# Patient Record
Sex: MALE | Race: OTHER | HISPANIC OR LATINO | URBAN - METROPOLITAN AREA
[De-identification: names, ages, dates, MRNs, and addresses within clinical notes are randomized per-mention and may not be internally consistent; named-entity substitution may affect disease eponyms.]

---

## 2024-04-05 ENCOUNTER — EMERGENCY (EMERGENCY)
Facility: HOSPITAL | Age: 19
LOS: 1 days | Discharge: HOME | End: 2024-04-05
Attending: STUDENT IN AN ORGANIZED HEALTH CARE EDUCATION/TRAINING PROGRAM | Admitting: STUDENT IN AN ORGANIZED HEALTH CARE EDUCATION/TRAINING PROGRAM
Payer: COMMERCIAL

## 2024-04-05 VITALS
RESPIRATION RATE: 20 BRPM | HEART RATE: 114 BPM | DIASTOLIC BLOOD PRESSURE: 59 MMHG | TEMPERATURE: 101 F | OXYGEN SATURATION: 97 % | SYSTOLIC BLOOD PRESSURE: 101 MMHG

## 2024-04-05 PROCEDURE — 93010 ELECTROCARDIOGRAM REPORT: CPT

## 2024-04-05 PROCEDURE — 99284 EMERGENCY DEPT VISIT MOD MDM: CPT

## 2024-04-05 NOTE — ED ADULT TRIAGE NOTE - CHIEF COMPLAINT QUOTE
Pt with no past medical history comes in c/o fever, sore throat, cough, headaches and generalized weakness since 4 days ago. Pt was seen at urgent care on Tuesday and had negative covid and flu tests . Had been taking Tylenol at home. Tonight he passed out in the bathroom and hit his head. Pt is febrile and tachycardic in triage.

## 2024-04-06 VITALS
DIASTOLIC BLOOD PRESSURE: 55 MMHG | OXYGEN SATURATION: 100 % | SYSTOLIC BLOOD PRESSURE: 109 MMHG | HEART RATE: 65 BPM | TEMPERATURE: 98 F | RESPIRATION RATE: 15 BRPM

## 2024-04-06 LAB
ALBUMIN SERPL ELPH-MCNC: 4.2 G/DL — SIGNIFICANT CHANGE UP (ref 3.3–5)
ALP SERPL-CCNC: 89 U/L — SIGNIFICANT CHANGE UP (ref 60–270)
ALT FLD-CCNC: 11 U/L — SIGNIFICANT CHANGE UP (ref 4–41)
ANION GAP SERPL CALC-SCNC: 14 MMOL/L — SIGNIFICANT CHANGE UP (ref 7–14)
APPEARANCE UR: CLEAR — SIGNIFICANT CHANGE UP
APTT BLD: 31.7 SEC — SIGNIFICANT CHANGE UP (ref 24.5–35.6)
AST SERPL-CCNC: 20 U/L — SIGNIFICANT CHANGE UP (ref 4–40)
BACTERIA # UR AUTO: NEGATIVE /HPF — SIGNIFICANT CHANGE UP
BASE EXCESS BLDV CALC-SCNC: 2 MMOL/L — SIGNIFICANT CHANGE UP (ref -2–3)
BASOPHILS # BLD AUTO: 0.01 K/UL — SIGNIFICANT CHANGE UP (ref 0–0.2)
BASOPHILS NFR BLD AUTO: 0.1 % — SIGNIFICANT CHANGE UP (ref 0–2)
BILIRUB SERPL-MCNC: 0.4 MG/DL — SIGNIFICANT CHANGE UP (ref 0.2–1.2)
BILIRUB UR-MCNC: NEGATIVE — SIGNIFICANT CHANGE UP
BLOOD GAS VENOUS COMPREHENSIVE RESULT: SIGNIFICANT CHANGE UP
BUN SERPL-MCNC: 11 MG/DL — SIGNIFICANT CHANGE UP (ref 7–23)
CALCIUM SERPL-MCNC: 9 MG/DL — SIGNIFICANT CHANGE UP (ref 8.4–10.5)
CAST: 2 /LPF — SIGNIFICANT CHANGE UP (ref 0–4)
CHLORIDE BLDV-SCNC: 97 MMOL/L — SIGNIFICANT CHANGE UP (ref 96–108)
CHLORIDE SERPL-SCNC: 97 MMOL/L — LOW (ref 98–107)
CO2 BLDV-SCNC: 28.7 MMOL/L — HIGH (ref 22–26)
CO2 SERPL-SCNC: 23 MMOL/L — SIGNIFICANT CHANGE UP (ref 22–31)
COLOR SPEC: YELLOW — SIGNIFICANT CHANGE UP
CREAT SERPL-MCNC: 0.87 MG/DL — SIGNIFICANT CHANGE UP (ref 0.5–1.3)
DIFF PNL FLD: NEGATIVE — SIGNIFICANT CHANGE UP
EGFR: 127 ML/MIN/1.73M2 — SIGNIFICANT CHANGE UP
EOSINOPHIL # BLD AUTO: 0 K/UL — SIGNIFICANT CHANGE UP (ref 0–0.5)
EOSINOPHIL NFR BLD AUTO: 0 % — SIGNIFICANT CHANGE UP (ref 0–6)
FLUAV AG NPH QL: SIGNIFICANT CHANGE UP
FLUBV AG NPH QL: DETECTED
GAS PNL BLDV: 133 MMOL/L — LOW (ref 136–145)
GLUCOSE BLDV-MCNC: 99 MG/DL — SIGNIFICANT CHANGE UP (ref 70–99)
GLUCOSE SERPL-MCNC: 100 MG/DL — HIGH (ref 70–99)
GLUCOSE UR QL: NEGATIVE MG/DL — SIGNIFICANT CHANGE UP
HCO3 BLDV-SCNC: 27 MMOL/L — SIGNIFICANT CHANGE UP (ref 22–29)
HCT VFR BLD CALC: 41.7 % — SIGNIFICANT CHANGE UP (ref 39–50)
HCT VFR BLDA CALC: 47 % — SIGNIFICANT CHANGE UP (ref 39–51)
HGB BLD CALC-MCNC: 15.6 G/DL — SIGNIFICANT CHANGE UP (ref 12.6–17.4)
HGB BLD-MCNC: 14.9 G/DL — SIGNIFICANT CHANGE UP (ref 13–17)
IANC: 8.34 K/UL — HIGH (ref 1.8–7.4)
IMM GRANULOCYTES NFR BLD AUTO: 0.4 % — SIGNIFICANT CHANGE UP (ref 0–0.9)
INR BLD: 1.27 RATIO — HIGH (ref 0.85–1.18)
KETONES UR-MCNC: 15 MG/DL
LACTATE BLDV-MCNC: 1.4 MMOL/L — SIGNIFICANT CHANGE UP (ref 0.5–2)
LEUKOCYTE ESTERASE UR-ACNC: NEGATIVE — SIGNIFICANT CHANGE UP
LYMPHOCYTES # BLD AUTO: 0.77 K/UL — LOW (ref 1–3.3)
LYMPHOCYTES # BLD AUTO: 7.6 % — LOW (ref 13–44)
MCHC RBC-ENTMCNC: 30.4 PG — SIGNIFICANT CHANGE UP (ref 27–34)
MCHC RBC-ENTMCNC: 35.7 GM/DL — SIGNIFICANT CHANGE UP (ref 32–36)
MCV RBC AUTO: 85.1 FL — SIGNIFICANT CHANGE UP (ref 80–100)
MONOCYTES # BLD AUTO: 0.93 K/UL — HIGH (ref 0–0.9)
MONOCYTES NFR BLD AUTO: 9.2 % — SIGNIFICANT CHANGE UP (ref 2–14)
NEUTROPHILS # BLD AUTO: 8.34 K/UL — HIGH (ref 1.8–7.4)
NEUTROPHILS NFR BLD AUTO: 82.7 % — HIGH (ref 43–77)
NITRITE UR-MCNC: NEGATIVE — SIGNIFICANT CHANGE UP
NRBC # BLD: 0 /100 WBCS — SIGNIFICANT CHANGE UP (ref 0–0)
NRBC # FLD: 0 K/UL — SIGNIFICANT CHANGE UP (ref 0–0)
PCO2 BLDV: 44 MMHG — SIGNIFICANT CHANGE UP (ref 42–55)
PH BLDV: 7.4 — SIGNIFICANT CHANGE UP (ref 7.32–7.43)
PH UR: 6.5 — SIGNIFICANT CHANGE UP (ref 5–8)
PLATELET # BLD AUTO: 191 K/UL — SIGNIFICANT CHANGE UP (ref 150–400)
PO2 BLDV: 34 MMHG — SIGNIFICANT CHANGE UP (ref 25–45)
POTASSIUM BLDV-SCNC: 3.9 MMOL/L — SIGNIFICANT CHANGE UP (ref 3.5–5.1)
POTASSIUM SERPL-MCNC: 4.1 MMOL/L — SIGNIFICANT CHANGE UP (ref 3.5–5.3)
POTASSIUM SERPL-SCNC: 4.1 MMOL/L — SIGNIFICANT CHANGE UP (ref 3.5–5.3)
PROT SERPL-MCNC: 8.1 G/DL — SIGNIFICANT CHANGE UP (ref 6–8.3)
PROT UR-MCNC: 30 MG/DL
PROTHROM AB SERPL-ACNC: 14.1 SEC — HIGH (ref 9.5–13)
RBC # BLD: 4.9 M/UL — SIGNIFICANT CHANGE UP (ref 4.2–5.8)
RBC # FLD: 11.9 % — SIGNIFICANT CHANGE UP (ref 10.3–14.5)
RBC CASTS # UR COMP ASSIST: 5 /HPF — HIGH (ref 0–4)
RSV RNA NPH QL NAA+NON-PROBE: SIGNIFICANT CHANGE UP
SAO2 % BLDV: 59.6 % — LOW (ref 67–88)
SARS-COV-2 RNA SPEC QL NAA+PROBE: SIGNIFICANT CHANGE UP
SODIUM SERPL-SCNC: 134 MMOL/L — LOW (ref 135–145)
SP GR SPEC: 1.03 — SIGNIFICANT CHANGE UP (ref 1–1.03)
SQUAMOUS # UR AUTO: 0 /HPF — SIGNIFICANT CHANGE UP (ref 0–5)
UROBILINOGEN FLD QL: 1 MG/DL — SIGNIFICANT CHANGE UP (ref 0.2–1)
WBC # BLD: 10.09 K/UL — SIGNIFICANT CHANGE UP (ref 3.8–10.5)
WBC # FLD AUTO: 10.09 K/UL — SIGNIFICANT CHANGE UP (ref 3.8–10.5)
WBC UR QL: 0 /HPF — SIGNIFICANT CHANGE UP (ref 0–5)

## 2024-04-06 PROCEDURE — 71046 X-RAY EXAM CHEST 2 VIEWS: CPT | Mod: 26

## 2024-04-06 RX ORDER — ACETAMINOPHEN 500 MG
1000 TABLET ORAL ONCE
Refills: 0 | Status: COMPLETED | OUTPATIENT
Start: 2024-04-06 | End: 2024-04-06

## 2024-04-06 RX ORDER — SODIUM CHLORIDE 9 MG/ML
2000 INJECTION INTRAMUSCULAR; INTRAVENOUS; SUBCUTANEOUS ONCE
Refills: 0 | Status: COMPLETED | OUTPATIENT
Start: 2024-04-06 | End: 2024-04-06

## 2024-04-06 RX ADMIN — Medication 400 MILLIGRAM(S): at 00:41

## 2024-04-06 RX ADMIN — SODIUM CHLORIDE 2000 MILLILITER(S): 9 INJECTION INTRAMUSCULAR; INTRAVENOUS; SUBCUTANEOUS at 00:42

## 2024-04-06 NOTE — ED ADULT NURSE NOTE - OBJECTIVE STATEMENT
A&Ox4. Denies past medical history. Comes in c/o fever, sore throat, cough, headaches and generalized weakness since 4 days ago. Pt was seen at urgent care on Tuesday and had negative covid and flu tests . Had been taking Tylenol, Mucinex and Nyquil at home. Tonight he passed out in the bathroom and hit his head. States fall was unwitnessed and not sure how long he was out for, but his brothers found him. No visible lacerations or bruising to the head. Pt is febrile and tachycardic in triage. Denies CP, SOB or N/V/D. Respirations even and unlabored. 20 gauge IV placed in right AC. Labs obtained. Medications given as per current care plan. CXR complete. Safety precautions in place.

## 2024-04-06 NOTE — ED ADULT NURSE NOTE - NSFALLRISKINTERV_ED_ALL_ED

## 2024-04-06 NOTE — ED PROVIDER NOTE - PATIENT PORTAL LINK FT
You can access the FollowMyHealth Patient Portal offered by [facility] by registering at the following website: [email or web address]. By joining [facility]’s FollowMyHealth portal, you will also be able to view your health information using other applications (apps) compatible with our system.

## 2024-04-06 NOTE — ED PROVIDER NOTE - CLINICAL SUMMARY MEDICAL DECISION MAKING FREE TEXT BOX
18-year-old male, denies past medical history, presents to ED complaining of fever, sore throat, cough, headache, and generalized weakness x 4 days.  Patient notes immediately prior to this was sick with a "stomach bug".  Patient was seen in urgent care 3 days ago and had negative COVID/flu test.  Patient has been taking Tylenol at home with temporary relief of fever.  Patient notes that he has had poor p.o. intake last few days.  Tonight he was having a bowel movement in the bathroom, states that he stood up and then immediately passed out.  Patient believes that he may have hit his head.  Patient was unconscious for approximately 1 minute, was helped up by his family.  Patient has been ambulatory since syncope.  Patient has never passed out before.  Patient denies any vision changes, chest pain, shortness of breath, abdominal pain, nausea/vomiting/diarrhea, urinary symptoms, weakness/numbness, lightheadedness/dizziness, rashes or any other symptoms at this time.  Patient also denies recent travel/sick contacts.    Patient is febrile and tachycardic, but is normotensive and satting well on room air.  Physical exam significant for ill-appearing male, in no acute distress.  Head is normocephalic is atraumatic.  Extraocular movements intact.  Pupils equal round reactive to light.  No conjunctival pallor.  Cranial nerves II to XII intact.  Patient with bilateral cervical lymphadenopathy.  Posterior oropharynx is slightly erythematous.  No tonsillar edema/exudates.  Uvula is midline.  No pooling secretions.  Heart is tachycardic, without murmur.  Lungs clear to auscultation bilaterally.  No stridor.  Bilateral TMs are clear.  Abdomen is soft and nontender/nondistended.  No CVA tenderness.  No lower extremity edema.  Skin is warm and well-perfused without rashes.  Normal strength/sensation in all extremities.  Pulses are 2+ throughout.    Differential diagnosis includes but not limited to viral syndrome versus pneumonia.  Patient presentation/vitals  SIRS criteria.  Will perform infectious workup.  Plan to check basic labs/electrolytes, VBG, blood cultures, flu/COVID swab, UA, and chest x-ray.  Will provide IV fluid and Tylenol for symptom relief.  Given low suspicion for bacterial etiology, will hold off on empiric antibiotics for the time being.  Dispo pending results and reassessment.

## 2024-04-06 NOTE — ED PROVIDER NOTE - PROGRESS NOTE DETAILS
Mary MIX: Received sign out from my colleague Dr. Castro pt presents w c/f viral syndrome found to be flu positive, studies otherwise non actionable, Strict return precautions were discussed. Pt expressed understanding.

## 2024-04-07 LAB
CULTURE RESULTS: SIGNIFICANT CHANGE UP
SPECIMEN SOURCE: SIGNIFICANT CHANGE UP

## 2024-04-11 LAB
CULTURE RESULTS: SIGNIFICANT CHANGE UP
CULTURE RESULTS: SIGNIFICANT CHANGE UP
SPECIMEN SOURCE: SIGNIFICANT CHANGE UP
SPECIMEN SOURCE: SIGNIFICANT CHANGE UP

## 2024-08-28 ENCOUNTER — INPATIENT (INPATIENT)
Facility: HOSPITAL | Age: 19
LOS: 0 days | Discharge: HOME | End: 2024-08-29
Attending: SURGERY | Admitting: SURGERY
Payer: COMMERCIAL

## 2024-08-28 VITALS
RESPIRATION RATE: 16 BRPM | WEIGHT: 130.07 LBS | OXYGEN SATURATION: 99 % | DIASTOLIC BLOOD PRESSURE: 64 MMHG | SYSTOLIC BLOOD PRESSURE: 100 MMHG | TEMPERATURE: 98 F | HEART RATE: 65 BPM

## 2024-08-28 DIAGNOSIS — K35.80 UNSPECIFIED ACUTE APPENDICITIS: ICD-10-CM

## 2024-08-28 LAB
ALBUMIN SERPL ELPH-MCNC: 4.4 G/DL — SIGNIFICANT CHANGE UP (ref 3.3–5)
ALP SERPL-CCNC: 88 U/L — SIGNIFICANT CHANGE UP (ref 60–270)
ALT FLD-CCNC: 11 U/L — SIGNIFICANT CHANGE UP (ref 4–41)
ANION GAP SERPL CALC-SCNC: 12 MMOL/L — SIGNIFICANT CHANGE UP (ref 7–14)
APPEARANCE UR: CLEAR — SIGNIFICANT CHANGE UP
APTT BLD: 28.9 SEC — SIGNIFICANT CHANGE UP (ref 24.5–35.6)
AST SERPL-CCNC: 16 U/L — SIGNIFICANT CHANGE UP (ref 4–40)
BACTERIA # UR AUTO: NEGATIVE /HPF — SIGNIFICANT CHANGE UP
BASOPHILS # BLD AUTO: 0.04 K/UL — SIGNIFICANT CHANGE UP (ref 0–0.2)
BASOPHILS NFR BLD AUTO: 0.2 % — SIGNIFICANT CHANGE UP (ref 0–2)
BILIRUB SERPL-MCNC: 0.9 MG/DL — SIGNIFICANT CHANGE UP (ref 0.2–1.2)
BILIRUB UR-MCNC: NEGATIVE — SIGNIFICANT CHANGE UP
BLD GP AB SCN SERPL QL: NEGATIVE — SIGNIFICANT CHANGE UP
BLOOD GAS VENOUS COMPREHENSIVE RESULT: SIGNIFICANT CHANGE UP
BUN SERPL-MCNC: 11 MG/DL — SIGNIFICANT CHANGE UP (ref 7–23)
CALCIUM SERPL-MCNC: 9.6 MG/DL — SIGNIFICANT CHANGE UP (ref 8.4–10.5)
CAST: 1 /LPF — SIGNIFICANT CHANGE UP (ref 0–4)
CHLORIDE SERPL-SCNC: 101 MMOL/L — SIGNIFICANT CHANGE UP (ref 98–107)
CO2 SERPL-SCNC: 23 MMOL/L — SIGNIFICANT CHANGE UP (ref 22–31)
COLOR SPEC: YELLOW — SIGNIFICANT CHANGE UP
CREAT SERPL-MCNC: 0.71 MG/DL — SIGNIFICANT CHANGE UP (ref 0.5–1.3)
DIFF PNL FLD: NEGATIVE — SIGNIFICANT CHANGE UP
EGFR: 136 ML/MIN/1.73M2 — SIGNIFICANT CHANGE UP
EOSINOPHIL # BLD AUTO: 0 K/UL — SIGNIFICANT CHANGE UP (ref 0–0.5)
EOSINOPHIL NFR BLD AUTO: 0 % — SIGNIFICANT CHANGE UP (ref 0–6)
GLUCOSE SERPL-MCNC: 115 MG/DL — HIGH (ref 70–99)
GLUCOSE UR QL: NEGATIVE MG/DL — SIGNIFICANT CHANGE UP
HCT VFR BLD CALC: 41.7 % — SIGNIFICANT CHANGE UP (ref 39–50)
HGB BLD-MCNC: 14.8 G/DL — SIGNIFICANT CHANGE UP (ref 13–17)
IANC: 21.25 K/UL — HIGH (ref 1.8–7.4)
IMM GRANULOCYTES NFR BLD AUTO: 0.7 % — SIGNIFICANT CHANGE UP (ref 0–0.9)
INR BLD: 1.18 RATIO — SIGNIFICANT CHANGE UP (ref 0.85–1.18)
KETONES UR-MCNC: 40 MG/DL
LEUKOCYTE ESTERASE UR-ACNC: NEGATIVE — SIGNIFICANT CHANGE UP
LIDOCAIN IGE QN: 23 U/L — SIGNIFICANT CHANGE UP (ref 7–60)
LYMPHOCYTES # BLD AUTO: 0.47 K/UL — LOW (ref 1–3.3)
LYMPHOCYTES # BLD AUTO: 2 % — LOW (ref 13–44)
MCHC RBC-ENTMCNC: 31.1 PG — SIGNIFICANT CHANGE UP (ref 27–34)
MCHC RBC-ENTMCNC: 35.5 GM/DL — SIGNIFICANT CHANGE UP (ref 32–36)
MCV RBC AUTO: 87.6 FL — SIGNIFICANT CHANGE UP (ref 80–100)
MONOCYTES # BLD AUTO: 1.13 K/UL — HIGH (ref 0–0.9)
MONOCYTES NFR BLD AUTO: 4.9 % — SIGNIFICANT CHANGE UP (ref 2–14)
NEUTROPHILS # BLD AUTO: 21.25 K/UL — HIGH (ref 1.8–7.4)
NEUTROPHILS NFR BLD AUTO: 92.2 % — HIGH (ref 43–77)
NITRITE UR-MCNC: NEGATIVE — SIGNIFICANT CHANGE UP
NRBC # BLD: 0 /100 WBCS — SIGNIFICANT CHANGE UP (ref 0–0)
NRBC # FLD: 0 K/UL — SIGNIFICANT CHANGE UP (ref 0–0)
PH UR: 8 — SIGNIFICANT CHANGE UP (ref 5–8)
PLATELET # BLD AUTO: 263 K/UL — SIGNIFICANT CHANGE UP (ref 150–400)
POTASSIUM SERPL-MCNC: 3.7 MMOL/L — SIGNIFICANT CHANGE UP (ref 3.5–5.3)
POTASSIUM SERPL-SCNC: 3.7 MMOL/L — SIGNIFICANT CHANGE UP (ref 3.5–5.3)
PROT SERPL-MCNC: 7.5 G/DL — SIGNIFICANT CHANGE UP (ref 6–8.3)
PROT UR-MCNC: 30 MG/DL
PROTHROM AB SERPL-ACNC: 13.2 SEC — HIGH (ref 9.5–13)
RBC # BLD: 4.76 M/UL — SIGNIFICANT CHANGE UP (ref 4.2–5.8)
RBC # FLD: 11.9 % — SIGNIFICANT CHANGE UP (ref 10.3–14.5)
RBC CASTS # UR COMP ASSIST: 3 /HPF — SIGNIFICANT CHANGE UP (ref 0–4)
RH IG SCN BLD-IMP: POSITIVE — SIGNIFICANT CHANGE UP
SODIUM SERPL-SCNC: 136 MMOL/L — SIGNIFICANT CHANGE UP (ref 135–145)
SP GR SPEC: 1.05 — HIGH (ref 1–1.03)
SQUAMOUS # UR AUTO: 0 /HPF — SIGNIFICANT CHANGE UP (ref 0–5)
UROBILINOGEN FLD QL: 1 MG/DL — SIGNIFICANT CHANGE UP (ref 0.2–1)
WBC # BLD: 23.05 K/UL — HIGH (ref 3.8–10.5)
WBC # FLD AUTO: 23.05 K/UL — HIGH (ref 3.8–10.5)
WBC UR QL: 1 /HPF — SIGNIFICANT CHANGE UP (ref 0–5)

## 2024-08-28 PROCEDURE — 88304 TISSUE EXAM BY PATHOLOGIST: CPT | Mod: 26

## 2024-08-28 PROCEDURE — 99285 EMERGENCY DEPT VISIT HI MDM: CPT

## 2024-08-28 PROCEDURE — 74177 CT ABD & PELVIS W/CONTRAST: CPT | Mod: 26,MC

## 2024-08-28 DEVICE — STAPLER COVIDIEN TRI-STAPLE 45MM TAN RELOAD: Type: IMPLANTABLE DEVICE | Status: FUNCTIONAL

## 2024-08-28 RX ORDER — SODIUM CHLORIDE 9 MG/ML
1000 INJECTION INTRAMUSCULAR; INTRAVENOUS; SUBCUTANEOUS ONCE
Refills: 0 | Status: COMPLETED | OUTPATIENT
Start: 2024-08-28 | End: 2024-08-28

## 2024-08-28 RX ORDER — PIPERACILLIN SODIUM AND TAZOBACTAM SODIUM 3; .375 G/15ML; G/15ML
3.38 INJECTION, POWDER, FOR SOLUTION INTRAVENOUS ONCE
Refills: 0 | Status: COMPLETED | OUTPATIENT
Start: 2024-08-28 | End: 2024-08-28

## 2024-08-28 RX ORDER — FAMOTIDINE 10 MG/ML
20 INJECTION INTRAVENOUS ONCE
Refills: 0 | Status: COMPLETED | OUTPATIENT
Start: 2024-08-28 | End: 2024-08-28

## 2024-08-28 RX ORDER — ENOXAPARIN SODIUM 100 MG/ML
40 INJECTION SUBCUTANEOUS EVERY 24 HOURS
Refills: 0 | Status: DISCONTINUED | OUTPATIENT
Start: 2024-08-29 | End: 2024-08-29

## 2024-08-28 RX ORDER — KETOROLAC TROMETHAMINE 30 MG/ML
15 INJECTION, SOLUTION INTRAMUSCULAR ONCE
Refills: 0 | Status: DISCONTINUED | OUTPATIENT
Start: 2024-08-28 | End: 2024-08-28

## 2024-08-28 RX ORDER — PIPERACILLIN SODIUM AND TAZOBACTAM SODIUM 3; .375 G/15ML; G/15ML
3.38 INJECTION, POWDER, FOR SOLUTION INTRAVENOUS EVERY 8 HOURS
Refills: 0 | Status: DISCONTINUED | OUTPATIENT
Start: 2024-08-29 | End: 2024-08-29

## 2024-08-28 RX ORDER — ONDANSETRON 2 MG/ML
4 INJECTION, SOLUTION INTRAMUSCULAR; INTRAVENOUS ONCE
Refills: 0 | Status: COMPLETED | OUTPATIENT
Start: 2024-08-28 | End: 2024-08-28

## 2024-08-28 RX ORDER — ACETAMINOPHEN 325 MG/1
1000 TABLET ORAL EVERY 6 HOURS
Refills: 0 | Status: DISCONTINUED | OUTPATIENT
Start: 2024-08-28 | End: 2024-08-29

## 2024-08-28 RX ORDER — MAGNESIUM, ALUMINUM HYDROXIDE 200-225/5
30 SUSPENSION, ORAL (FINAL DOSE FORM) ORAL ONCE
Refills: 0 | Status: COMPLETED | OUTPATIENT
Start: 2024-08-28 | End: 2024-08-28

## 2024-08-28 RX ADMIN — Medication 100 MILLILITER(S): at 22:17

## 2024-08-28 RX ADMIN — PIPERACILLIN SODIUM AND TAZOBACTAM SODIUM 200 GRAM(S): 3; .375 INJECTION, POWDER, FOR SOLUTION INTRAVENOUS at 20:57

## 2024-08-28 RX ADMIN — SODIUM CHLORIDE 1000 MILLILITER(S): 9 INJECTION INTRAMUSCULAR; INTRAVENOUS; SUBCUTANEOUS at 20:23

## 2024-08-28 RX ADMIN — ONDANSETRON 4 MILLIGRAM(S): 2 INJECTION, SOLUTION INTRAMUSCULAR; INTRAVENOUS at 19:13

## 2024-08-28 RX ADMIN — FAMOTIDINE 20 MILLIGRAM(S): 10 INJECTION INTRAVENOUS at 19:13

## 2024-08-28 RX ADMIN — Medication 30 MILLILITER(S): at 19:12

## 2024-08-28 RX ADMIN — SODIUM CHLORIDE 1000 MILLILITER(S): 9 INJECTION INTRAMUSCULAR; INTRAVENOUS; SUBCUTANEOUS at 19:11

## 2024-08-28 RX ADMIN — KETOROLAC TROMETHAMINE 15 MILLIGRAM(S): 30 INJECTION, SOLUTION INTRAMUSCULAR at 21:21

## 2024-08-28 NOTE — ED PROVIDER NOTE - OBJECTIVE STATEMENT
18-year-old male with no known past medical history presents to the ED complaining of 1 day of periumbilical abdominal pain.  Patient describes persistent periumbilical abdominal pain, multiple episodes of nonbloody nonbilious vomiting.  He denies diarrhea, constipation, bloody stool, fevers, chills, or any other associated symptoms.

## 2024-08-28 NOTE — H&P ADULT - NSHPPHYSICALEXAM_GEN_ALL_CORE
VITAL SIGNS:  ICU Vital Signs Last 24 Hrs  T(C): 36.8 (28 Aug 2024 21:39), Max: 37.2 (28 Aug 2024 17:58)  T(F): 98.3 (28 Aug 2024 21:39), Max: 98.9 (28 Aug 2024 17:58)  HR: 67 (28 Aug 2024 21:39) (65 - 67)  BP: 107/64 (28 Aug 2024 21:39) (100/64 - 117/74)  BP(mean): --  ABP: --  ABP(mean): --  RR: 16 (28 Aug 2024 21:39) (16 - 16)  SpO2: 95% (28 Aug 2024 21:39) (95% - 99%)    O2 Parameters below as of 28 Aug 2024 21:39  Patient On (Oxygen Delivery Method): room air            PHYSICAL EXAMINATION:  General - well-nourished, no acute distress  Neuro - awake, alert, oriented   Lungs - breathing comfortably on room air  Heart - pulse regular  Abdomen - soft, tender to palpation in RLQ, positive Mcburney, nondistended  Extremities - all four extremities are warm

## 2024-08-28 NOTE — ED ADULT NURSE NOTE - OBJECTIVE STATEMENT
Received pt to intake 16, A+Ox4, ambulatory. C/O abdominal pain, nausea and vomiting since 10 am this morning, pt endorsing fatigue. Respirations even and unlabored, normal work of breathing, no accessory muscle use, speaking in full clear uninterrupted sentences. ABD is soft, non tender, non distended. Pt denies any chest pain, SOB, headache, dizziness, diarrhea, fever, chills. 20G to RAC, Labs sent, Medicated as per Provider orders, plan of care ongoing.

## 2024-08-28 NOTE — ED ADULT TRIAGE NOTE - CHIEF COMPLAINT QUOTE
Pt presents for generalized weakness, abdominal pain, n/v starting today, denies diarrhea/constipation, no dysuria/hematuria, afebrile, no past medical history.

## 2024-08-28 NOTE — H&P ADULT - NSHPLABSRESULTS_GEN_ALL_CORE
14.8   23.05 )-----------( 263      ( 28 Aug 2024 19:10 )             41.7       08-28    136  |  101  |  11  ----------------------------<  115<H>  3.7   |  23  |  0.71    Ca    9.6      28 Aug 2024 19:10    TPro  7.5  /  Alb  4.4  /  TBili  0.9  /  DBili  x   /  AST  16  /  ALT  11  /  AlkPhos  88  08-28      PT/INR - ( 28 Aug 2024 19:10 )   PT: 13.2 sec;   INR: 1.18 ratio         PTT - ( 28 Aug 2024 19:10 )  PTT:28.9 sec        VBG - ( 28 Aug 2024 19:10 )  pH: 7.38  /  pCO2: 48    /  pO2: 36    / HCO3: 28    / Base Excess: 2.4   /  SaO2: 61.9   Lactate: 1.8       IMAGING STUDIES:  < from: CT Abdomen and Pelvis w/ IV Cont (08.28.24 @ 20:03) >      CONTRAST/COMPLICATIONS:  IV Contrast: Omnipaque 350  90 cc administered   10 cc discarded  Oral Contrast: NONE  Complications: None reported at time of study completion    PROCEDURE:  CT of the Abdomen and Pelvis was performed.  Sagittal and coronal reformats were performed.    FINDINGS:  LOWER CHEST: Within normal limits.    LIVER: Within normal limits.  BILE DUCTS: Normal caliber.  GALLBLADDER: Within normal limits.  SPLEEN: Within normal limits.  PANCREAS: Within normal limits.  ADRENALS: Within normal limits.  KIDNEYS/URETERS: Within normal limits.    BLADDER: Within normal limits.  REPRODUCTIVE ORGANS: Prostate within normal limits.    BOWEL: No bowel obstruction. Appendix is distended with wall thickening   and periappendicular fluid with mild inflammatory changes.  PERITONEUM/RETROPERITONEUM: Within normal limits.  VESSELS: Within normal limits.  LYMPH NODES: No lymphadenopathy.  ABDOMINAL WALL: Within normal limits.  BONES: Within normal limits.    < end of copied text >

## 2024-08-28 NOTE — ED ADULT NURSE REASSESSMENT NOTE - NS ED NURSE REASSESS COMMENT FT1
Pt resting in stretcher, awaiting transport to admission bed. Offers no complaints at this time. IVF infusing as per orders. safety maintained throughout

## 2024-08-28 NOTE — H&P ADULT - ASSESSMENT
Mr. Lombardi is a 18 year old man presenting with acute appendicitis.    Plan:  - admit to surgery, Dr. Palacio  - added on for OR tonight, patient consented  - NPO/IVF resuscitation  - prn pain meds  - VTE ppx with LVX    discussed with Dr. Palacio    B Team Surgery   [id number]

## 2024-08-28 NOTE — ED PROVIDER NOTE - CLINICAL SUMMARY MEDICAL DECISION MAKING FREE TEXT BOX
18-year-old male with no known past medical history presents to the ED complaining of 1 day of periumbilical abdominal pain. HD stable, Imp: Abdominal pain, concern for acute appendicitis vs renal colic vs UTI vs gastroenteritis. Plan for labs, CT A/P, meds, reassess.

## 2024-08-28 NOTE — H&P ADULT - ATTENDING COMMENTS
Pt seen and examined.  Agree with resident eval and plan.  Imp: Acute appendicitis.  OR for lap appendectomy.  Consent obtained with risks and benefits discussed with patient.

## 2024-08-28 NOTE — ED PROVIDER NOTE - ATTENDING APP SHARED VISIT CONTRIBUTION OF CARE
Patient TBA for appendectomy after PTED with Anorexia N,V and PU pain as described as described mild WBCs 23!  CT= Acute uncomplicated appendicitis.   Surgery aware to eval patient and make reccs PTA   NPO antibiotics analgesics IVFs   will reassess

## 2024-08-29 VITALS
TEMPERATURE: 97 F | RESPIRATION RATE: 13 BRPM | DIASTOLIC BLOOD PRESSURE: 68 MMHG | HEART RATE: 58 BPM | OXYGEN SATURATION: 99 % | SYSTOLIC BLOOD PRESSURE: 104 MMHG

## 2024-08-29 PROBLEM — Z78.9 OTHER SPECIFIED HEALTH STATUS: Chronic | Status: ACTIVE | Noted: 2024-04-06

## 2024-08-29 PROCEDURE — 99222 1ST HOSP IP/OBS MODERATE 55: CPT | Mod: GC,57

## 2024-08-29 PROCEDURE — 44970 LAPAROSCOPY APPENDECTOMY: CPT | Mod: GC

## 2024-08-29 RX ORDER — FENTANYL CITRATE 50 UG/ML
50 INJECTION INTRAMUSCULAR; INTRAVENOUS
Refills: 0 | Status: DISCONTINUED | OUTPATIENT
Start: 2024-08-29 | End: 2024-08-29

## 2024-08-29 RX ORDER — ACETAMINOPHEN 325 MG/1
2 TABLET ORAL
Qty: 0 | Refills: 0 | DISCHARGE
Start: 2024-08-29

## 2024-08-29 RX ORDER — OXYCODONE HYDROCHLORIDE 5 MG/1
5 TABLET ORAL EVERY 6 HOURS
Refills: 0 | Status: DISCONTINUED | OUTPATIENT
Start: 2024-08-29 | End: 2024-08-29

## 2024-08-29 RX ORDER — ACETAMINOPHEN 325 MG/1
1000 TABLET ORAL EVERY 6 HOURS
Refills: 0 | Status: DISCONTINUED | OUTPATIENT
Start: 2024-08-29 | End: 2024-08-29

## 2024-08-29 RX ORDER — HYDROMORPHONE HYDROCHLORIDE 2 MG/1
0.25 TABLET ORAL
Refills: 0 | Status: DISCONTINUED | OUTPATIENT
Start: 2024-08-29 | End: 2024-08-29

## 2024-08-29 RX ORDER — ONDANSETRON 2 MG/ML
4 INJECTION, SOLUTION INTRAMUSCULAR; INTRAVENOUS ONCE
Refills: 0 | Status: DISCONTINUED | OUTPATIENT
Start: 2024-08-29 | End: 2024-08-29

## 2024-08-29 RX ORDER — OXYCODONE HYDROCHLORIDE 5 MG/1
1 TABLET ORAL
Qty: 5 | Refills: 0
Start: 2024-08-29

## 2024-08-29 RX ORDER — OXYCODONE HYDROCHLORIDE 5 MG/1
5 TABLET ORAL ONCE
Refills: 0 | Status: COMPLETED | OUTPATIENT
Start: 2024-08-29 | End: 2024-08-29

## 2024-08-29 RX ORDER — OXYCODONE HYDROCHLORIDE 5 MG/1
2.5 TABLET ORAL EVERY 6 HOURS
Refills: 0 | Status: DISCONTINUED | OUTPATIENT
Start: 2024-08-29 | End: 2024-08-29

## 2024-08-29 NOTE — ASU DISCHARGE PLAN (ADULT/PEDIATRIC) - MEDICATION INSTRUCTIONS
Take Tylenol 1g every 6 hours. Take Ibuprofen 600 mg every 6 hours. Alternate so that you are taking either Tylenol or Ibuprofen every three hours. Take narcotic pain medication for pain not relived by scheduled, over-the-counter medications.

## 2024-08-29 NOTE — ED ADULT NURSE REASSESSMENT NOTE - NS ED NURSE REASSESS COMMENT FT1
Pt to be taken to OR from emergency department at this time. report given to OR as well as inpatient RN on [address]. Pt belongings and valuables with pt mother.

## 2024-08-29 NOTE — BRIEF OPERATIVE NOTE - OPERATION/FINDINGS
Entered abdomen via supraumbilical Fredo. Additional ports placed under direct visualization. Base of appendix identified in RLQ. Window created between appendix and mesoappendix; appendix divided with EndoGIA stapler. Mesoappendix divided with Ligasure.

## 2024-08-29 NOTE — ASU DISCHARGE PLAN (ADULT/PEDIATRIC) - CARE PROVIDER_API CALL
Andrea Palacio  Surgery  [address], [address], [place] [address]  Phone: [phone]  Fax: [phone]  Follow Up Time: 2 weeks

## 2024-09-04 LAB — SURGICAL PATHOLOGY STUDY: SIGNIFICANT CHANGE UP

## 2025-04-18 NOTE — ED ADULT NURSE NOTE - CAS DISCH CONDITION
Wound VAC instructions:  Vac settings: 125 mmHg    If VAC alarms, follow prompts on screen. If you are unable to get the alarm to resolve, call the wound care clinic Monday- Friday 8:00 am - 4:30 pm. If this happens after hours or on the weekend, call [facility] at [phone]. If they are unable to help you trouble shoot the alarm, the tape and black sponge must be removed from the wound. If the vac is not functioning for more than 2 hours it MUST come off. If it is not removed the foam will dry onto wound and increase your risk of infection. To remove the sponge options include: self trouble shooting or going to the emergency room. The emergency room will not reapply the vac. They will remove the vac dressing and place a wet to dry dressing (detailed below).    What you can do yourself at home:  Leakage Alarm -  you may hear a whistling sound  Run your fingers over the tape, along any creases. If you find the leak the whistling sound will stop. Patch this area with tape.  2.  Blockage alarm  Change the cannister   3.  Remove the vac dressing and apply wet to dry dressing; instructions below.    How to remove wound VAC-    Always wash your hands with soap and water before touching the dressing or the wound.  You want to limit bacteria in or around the wound.  Wearing gloves is good but it does not replace hand washing.     Remove transparent dressing from skin and discard.  Remove black foam from wound and discard.  Wash your wound with mild soap and water (No antibacterial or scented soaps)  Place normal saline moist gauze into the wound  Cover with dry dressing (abd pad, gauze) then tape in place  Call wound care clinic during next available business hours for further instructions     Always monitor for signs of infection or deterioration in the wound and surrounding skin.  These symptoms can include fever, chills, sweats.  Increased redness, drainage or swelling around the wound can also, but not always,  indicate infection.  If you are experiencing any of these, please seek medical attention.  You may call the clinic during regular business hours. If it is after hours or on the weekend you can visit an urgent care facility or emergency department.       If you have any questions or concerns between now and your appointment please call the clinic.     [facility]  [address] [place] [address]    Telephone [phone]  Fax [phone]   Stable

## (undated) DEVICE — TUBING HYDRO-SURG PLUS IRRIGATOR W SMOKEVAC & PROBE

## (undated) DEVICE — TROCAR COVIDIEN BLUNT TIP HASSAN 10MM

## (undated) DEVICE — SUT VICRYL 0 27" UR-6

## (undated) DEVICE — SOL IRR POUR H2O 500ML

## (undated) DEVICE — TROCAR COVIDIEN VERSAPORT BLADELESS OPTICAL 5MM STANDARD

## (undated) DEVICE — ELCTR GROUNDING PAD ADULT COVIDIEN

## (undated) DEVICE — STAPLER COVIDIEN ENDO GIA STANDARD HANDLE

## (undated) DEVICE — TUBING STRYKEFLOW II SUCTION / IRRIGATOR

## (undated) DEVICE — TIP METZENBAUM SCISSOR MONOPOLAR ENDOCUT (ORANGE)

## (undated) DEVICE — LIGASURE BLUNT TIP 37CM

## (undated) DEVICE — SHEARS COVIDIEN ENDO SHEAR 5MM X 31CM W UNIPOLAR CAUTERY

## (undated) DEVICE — ELCTR BOVIE PENCIL SMOKE EVACUATION

## (undated) DEVICE — TUBING OLYMPUS INSUFFLATION

## (undated) DEVICE — LABELS BLANK W PEN

## (undated) DEVICE — POSITIONER STRAP ARMBOARD VELCRO TS-30

## (undated) DEVICE — PROTECTOR HEEL / ELBOW FLUFFY

## (undated) DEVICE — SUT MONOCRYL 4-0 27" PS-2 UNDYED

## (undated) DEVICE — SOL IRR POUR NS 0.9% 500ML

## (undated) DEVICE — DISSECTOR ENDOSCOPIC KITTNER SINGLE TIP

## (undated) DEVICE — BASIN SET SINGLE

## (undated) DEVICE — VENODYNE/SCD SLEEVE CALF MEDIUM

## (undated) DEVICE — SCOPE WARMER SEAL DISP

## (undated) DEVICE — LIGASURE MARYLAND 37CM

## (undated) DEVICE — PACK GENERAL LAPAROSCOPY

## (undated) DEVICE — DRAPE TOWEL BLUE 17" X 24"

## (undated) DEVICE — WARMING BLANKET FULL ADULT